# Patient Record
Sex: FEMALE | Race: WHITE | NOT HISPANIC OR LATINO | Employment: OTHER | URBAN - METROPOLITAN AREA
[De-identification: names, ages, dates, MRNs, and addresses within clinical notes are randomized per-mention and may not be internally consistent; named-entity substitution may affect disease eponyms.]

---

## 2021-01-19 ENCOUNTER — DOCUMENTATION ONLY (OUTPATIENT)
Dept: ONCOLOGY | Facility: CLINIC | Age: 36
End: 2021-01-19

## 2021-01-19 ENCOUNTER — TRANSCRIBE ORDERS (OUTPATIENT)
Dept: OTHER | Age: 36
End: 2021-01-19

## 2021-01-19 DIAGNOSIS — C54.1 ENDOMETRIAL CANCER (H): Primary | ICD-10-CM

## 2021-01-19 DIAGNOSIS — R59.0 PELVIC LYMPHADENOPATHY: ICD-10-CM

## 2021-01-19 NOTE — PROGRESS NOTES
Action    Action Taken 1/19/21:    -Pulled recs for [facility] thru [facility]    -Attempted to call pt, went straight to , Mail box is full. Pt at one point saw Dr. Danielson/[facility] Idla Kelley & Jean Claude, needs auth/SHARA.  2:22 PM    1/20/21: 2nd call, straight to /MB Full  1:30 PM    1/22/21: 3rd attempt, same result.  8:15 AM

## 2021-01-27 ENCOUNTER — TELEPHONE (OUTPATIENT)
Dept: ONCOLOGY | Facility: CLINIC | Age: 36
End: 2021-01-27

## 2021-07-21 NOTE — TELEPHONE ENCOUNTER
FUTURE VISIT INFORMATION      FUTURE VISIT INFORMATION:    Date: 9/28/21    Time: 11:00am    Location: [facility]  REFERRAL INFORMATION:    [facility]    Reason for visit/diagnosis  Panniculectomy    RECORDS REQUESTED FROM:       Clinic name Comments Records Status Imaging Status   [facility] Plastic surgery Phone note 3/9/21 [facility]

## 2021-09-28 ENCOUNTER — PRE VISIT (OUTPATIENT)
Dept: PLASTIC SURGERY | Facility: CLINIC | Age: 36
End: 2021-09-28